# Patient Record
Sex: FEMALE | Race: WHITE
[De-identification: names, ages, dates, MRNs, and addresses within clinical notes are randomized per-mention and may not be internally consistent; named-entity substitution may affect disease eponyms.]

---

## 2019-12-17 ENCOUNTER — HOSPITAL ENCOUNTER (OUTPATIENT)
Dept: HOSPITAL 92 - SDC | Age: 19
Discharge: HOME | End: 2019-12-17
Attending: OTOLARYNGOLOGY
Payer: OTHER GOVERNMENT

## 2019-12-17 VITALS — BODY MASS INDEX: 23.7 KG/M2

## 2019-12-17 DIAGNOSIS — Z91.018: ICD-10-CM

## 2019-12-17 DIAGNOSIS — G47.30: ICD-10-CM

## 2019-12-17 DIAGNOSIS — J35.01: ICD-10-CM

## 2019-12-17 DIAGNOSIS — J03.91: Primary | ICD-10-CM

## 2019-12-17 DIAGNOSIS — Z91.011: ICD-10-CM

## 2019-12-17 LAB
PREGS CONTROL BACKGROUND?: (no result)
PREGS CONTROL BAR APPEAR?: YES

## 2019-12-17 PROCEDURE — 0CTPXZZ RESECTION OF TONSILS, EXTERNAL APPROACH: ICD-10-PCS | Performed by: OTOLARYNGOLOGY

## 2019-12-17 PROCEDURE — 84703 CHORIONIC GONADOTROPIN ASSAY: CPT

## 2019-12-17 PROCEDURE — 88304 TISSUE EXAM BY PATHOLOGIST: CPT

## 2019-12-17 PROCEDURE — 0CTQXZZ RESECTION OF ADENOIDS, EXTERNAL APPROACH: ICD-10-PCS | Performed by: OTOLARYNGOLOGY

## 2019-12-17 PROCEDURE — 85014 HEMATOCRIT: CPT

## 2019-12-17 PROCEDURE — 36415 COLL VENOUS BLD VENIPUNCTURE: CPT

## 2019-12-18 NOTE — OP
DATE OF PROCEDURE:  12/17/2019



PROCEDURE PERFORMED:  Tonsillectomy and adenoidectomy.



PREOPERATIVE DIAGNOSES:  Recurrent tonsillitis and sleep-disordered breathing and

tonsillar hypertrophy. 



PERMIT:  Procedure, benefits, risks including bleeding, infection, injury from

anesthesia, allergic reaction, and recurrent oropharyngeal bleeding causing return

to operating room and cautery and alternatives were reviewed with the patient and

family who expressed understanding of the information.  The consent form was signed

and witnessed.  A paper copy of the consent form is available for review in the

paper chart. 



INDICATIONS:  Patient presenting with sleep-disordered breathing and recurrent

tonsillitis. 



DESCRIPTION OF OPERATION:  The patient was brought to the operating room, laid

supine on the operating room table.  Anesthesia was induced.  A complete time-out

was performed before commencement of the surgical procedure.  The table was turned

to 90 degrees and the patient gently suspended using the Renaldo-Anthony mouth gag.  A

red rubber catheter was placed in the nares and a mirror was used to examine the

nasopharynx.  Attention was turned to the right tonsil.  The tonsil was removed 1st

by incising the anterior tonsillar pillar and then dissecting it from its inferior

fossa bridging vessels and the fibers were cauterized on the setting of 15.  The

tonsil was removed anatomically in its entirety and hemostasis was achieved using

suction cautery.  Attention was then turned to the left and the left tonsil was

removed in the identical manner.  Attention was turned to the adenoid tissue which

was evaluated with a dental mirror and found to be hypertrophied and obstructed.

The adenoid tissue was removed with a suction Bovie on a setting of 20 and the

tissue was reduced in size, taking care to preserve bilateral kayleigh and eustachian

tube without cautery.  The nasopharynx was then irrigated and suctioned.  Stomach

contents were suctioned and the patient was turned back to anesthesia for emergence. 







Job ID:  [id number]

## 2019-12-19 NOTE — OP
DATE OF PROCEDURE:  12/17/2019



PROCEDURE PERFORMED:  Bilateral tonsillectomy and adenoidectomy.



PREOPERATIVE DIAGNOSES:  Recurrent tonsillitis and sleep-disordered breathing and

tonsillar hypertrophy. 



PERMIT:  Procedure, benefits, risks including bleeding, infection, injury from

anesthesia, allergic reaction, and recurrent oropharyngeal bleeding causing return

to operating room and cautery and alternatives were reviewed with the patient and

family who expressed understanding of the information.  The consent form was signed

and witnessed.  A paper copy of the consent form is available for review in the

paper chart. 



INDICATIONS:  Patient presenting with sleep-disordered breathing and recurrent

tonsillitis. 



DESCRIPTION OF OPERATION:  The patient was brought to the operating room, laid

supine on the operating room table.  Anesthesia was induced.  A complete time-out

was performed before commencement of the surgical procedure.  The table was turned

to 90 degrees and the patient gently suspended using the Renaldo-Anthony mouth gag.  A

red rubber catheter was placed in the nares and a mirror was used to examine the

nasopharynx.  Attention was turned to the right tonsil.  The tonsil was removed 1st

by incising the anterior tonsillar pillar and then dissecting it from its inferior

fossa bridging vessels and the fibers were cauterized on the setting of 15.  The

tonsil was removed anatomically in its entirety and hemostasis was achieved using

suction cautery.  Attention was then turned to the left and the left tonsil was

removed in the identical manner.  Attention was turned to the adenoid tissue which

was evaluated with a dental mirror and found to be hypertrophied and obstructed.

The adenoid tissue was removed with a suction Bovie on a setting of 20 and the

tissue was reduced in size, taking care to preserve bilateral kayleigh and eustachian

tube without cautery.  The nasopharynx was then irrigated and suctioned.  Stomach

contents were suctioned and the patient was turned back to anesthesia for emergence. 







Job ID:  [id number]